# Patient Record
Sex: FEMALE | Race: WHITE | NOT HISPANIC OR LATINO | Employment: UNEMPLOYED | URBAN - METROPOLITAN AREA
[De-identification: names, ages, dates, MRNs, and addresses within clinical notes are randomized per-mention and may not be internally consistent; named-entity substitution may affect disease eponyms.]

---

## 2022-01-31 ENCOUNTER — TRANSCRIBE ORDERS (OUTPATIENT)
Dept: ADMINISTRATIVE | Facility: HOSPITAL | Age: 27
End: 2022-01-31

## 2022-01-31 ENCOUNTER — LAB (OUTPATIENT)
Dept: LAB | Facility: HOSPITAL | Age: 27
End: 2022-01-31

## 2022-01-31 ENCOUNTER — HOSPITAL ENCOUNTER (OUTPATIENT)
Dept: CARDIOLOGY | Facility: HOSPITAL | Age: 27
Discharge: HOME | End: 2022-01-31

## 2022-01-31 DIAGNOSIS — Z01.818 PREOP TESTING: ICD-10-CM

## 2022-01-31 DIAGNOSIS — Z01.818 PREOP TESTING: Primary | ICD-10-CM

## 2022-01-31 LAB
ANION GAP SERPL CALCULATED.3IONS-SCNC: 9.2 MMOL/L (ref 5–15)
BASOPHILS # BLD AUTO: 0.02 10*3/MM3 (ref 0–0.2)
BASOPHILS NFR BLD AUTO: 0.3 % (ref 0–1.5)
BUN SERPL-MCNC: 10 MG/DL (ref 6–20)
BUN/CREAT SERPL: 13.2 (ref 7–25)
CALCIUM SPEC-SCNC: 9.9 MG/DL (ref 8.6–10.5)
CHLORIDE SERPL-SCNC: 100 MMOL/L (ref 98–107)
CO2 SERPL-SCNC: 26.8 MMOL/L (ref 22–29)
CREAT SERPL-MCNC: 0.76 MG/DL (ref 0.57–1)
DEPRECATED RDW RBC AUTO: 38.6 FL (ref 37–54)
EOSINOPHIL # BLD AUTO: 0.08 10*3/MM3 (ref 0–0.4)
EOSINOPHIL NFR BLD AUTO: 1.2 % (ref 0.3–6.2)
ERYTHROCYTE [DISTWIDTH] IN BLOOD BY AUTOMATED COUNT: 12.4 % (ref 12.3–15.4)
GFR SERPL CREATININE-BSD FRML MDRD: 91 ML/MIN/1.73
GLUCOSE SERPL-MCNC: 84 MG/DL (ref 65–99)
HCT VFR BLD AUTO: 40 % (ref 34–46.6)
HGB BLD-MCNC: 13.2 G/DL (ref 12–15.9)
IMM GRANULOCYTES # BLD AUTO: 0.03 10*3/MM3 (ref 0–0.05)
IMM GRANULOCYTES NFR BLD AUTO: 0.5 % (ref 0–0.5)
LYMPHOCYTES # BLD AUTO: 2.27 10*3/MM3 (ref 0.7–3.1)
LYMPHOCYTES NFR BLD AUTO: 34.3 % (ref 19.6–45.3)
MCH RBC QN AUTO: 28 PG (ref 26.6–33)
MCHC RBC AUTO-ENTMCNC: 33 G/DL (ref 31.5–35.7)
MCV RBC AUTO: 84.9 FL (ref 79–97)
MONOCYTES # BLD AUTO: 0.61 10*3/MM3 (ref 0.1–0.9)
MONOCYTES NFR BLD AUTO: 9.2 % (ref 5–12)
NEUTROPHILS NFR BLD AUTO: 3.61 10*3/MM3 (ref 1.7–7)
NEUTROPHILS NFR BLD AUTO: 54.5 % (ref 42.7–76)
NRBC BLD AUTO-RTO: 0.2 /100 WBC (ref 0–0.2)
PLATELET # BLD AUTO: 271 10*3/MM3 (ref 140–450)
PMV BLD AUTO: 10.4 FL (ref 6–12)
POTASSIUM SERPL-SCNC: 4.7 MMOL/L (ref 3.5–5.2)
QT INTERVAL: 405 MS
RBC # BLD AUTO: 4.71 10*6/MM3 (ref 3.77–5.28)
SARS-COV-2 ORF1AB RESP QL NAA+PROBE: NOT DETECTED
SODIUM SERPL-SCNC: 136 MMOL/L (ref 136–145)
WBC NRBC COR # BLD: 6.62 10*3/MM3 (ref 3.4–10.8)

## 2022-01-31 PROCEDURE — 93010 ELECTROCARDIOGRAM REPORT: CPT | Performed by: INTERNAL MEDICINE

## 2022-01-31 PROCEDURE — U0004 COV-19 TEST NON-CDC HGH THRU: HCPCS

## 2022-01-31 PROCEDURE — 80048 BASIC METABOLIC PNL TOTAL CA: CPT

## 2022-01-31 PROCEDURE — C9803 HOPD COVID-19 SPEC COLLECT: HCPCS

## 2022-01-31 PROCEDURE — 36415 COLL VENOUS BLD VENIPUNCTURE: CPT

## 2022-01-31 PROCEDURE — 85025 COMPLETE CBC W/AUTO DIFF WBC: CPT

## 2022-01-31 PROCEDURE — 93005 ELECTROCARDIOGRAM TRACING: CPT | Performed by: SURGERY

## 2022-02-07 ENCOUNTER — TRANSCRIBE ORDERS (OUTPATIENT)
Dept: ADMINISTRATIVE | Facility: HOSPITAL | Age: 27
End: 2022-02-07

## 2022-02-07 ENCOUNTER — LAB (OUTPATIENT)
Dept: LAB | Facility: HOSPITAL | Age: 27
End: 2022-02-07

## 2022-02-07 DIAGNOSIS — Z01.818 PRE-OP TESTING: ICD-10-CM

## 2022-02-07 DIAGNOSIS — Z01.818 PRE-OP TESTING: Primary | ICD-10-CM

## 2022-02-07 LAB — SARS-COV-2 ORF1AB RESP QL NAA+PROBE: NOT DETECTED

## 2022-02-07 PROCEDURE — U0004 COV-19 TEST NON-CDC HGH THRU: HCPCS

## 2022-02-07 PROCEDURE — C9803 HOPD COVID-19 SPEC COLLECT: HCPCS

## 2022-02-10 ENCOUNTER — LAB REQUISITION (OUTPATIENT)
Dept: LAB | Facility: HOSPITAL | Age: 27
End: 2022-02-10

## 2022-02-10 DIAGNOSIS — Z00.00 ENCOUNTER FOR GENERAL ADULT MEDICAL EXAMINATION WITHOUT ABNORMAL FINDINGS: ICD-10-CM

## 2022-02-10 PROCEDURE — 88304 TISSUE EXAM BY PATHOLOGIST: CPT | Performed by: SURGERY

## 2022-02-11 LAB
LAB AP CASE REPORT: NORMAL
LAB AP CLINICAL INFORMATION: NORMAL
PATH REPORT.FINAL DX SPEC: NORMAL
PATH REPORT.GROSS SPEC: NORMAL

## 2024-01-12 ENCOUNTER — APPOINTMENT (OUTPATIENT)
Dept: CT IMAGING | Facility: HOSPITAL | Age: 29
End: 2024-01-12
Payer: MEDICAID

## 2024-01-12 ENCOUNTER — HOSPITAL ENCOUNTER (EMERGENCY)
Facility: HOSPITAL | Age: 29
Discharge: HOME | End: 2024-01-12
Attending: EMERGENCY MEDICINE
Payer: MEDICAID

## 2024-01-12 ENCOUNTER — APPOINTMENT (OUTPATIENT)
Dept: GENERAL RADIOLOGY | Facility: HOSPITAL | Age: 29
End: 2024-01-12
Payer: MEDICAID

## 2024-01-12 VITALS
DIASTOLIC BLOOD PRESSURE: 65 MMHG | WEIGHT: 139.55 LBS | HEIGHT: 67 IN | HEART RATE: 70 BPM | BODY MASS INDEX: 21.9 KG/M2 | OXYGEN SATURATION: 100 % | TEMPERATURE: 97.5 F | SYSTOLIC BLOOD PRESSURE: 110 MMHG | RESPIRATION RATE: 18 BRPM

## 2024-01-12 DIAGNOSIS — R07.9 CHEST PAIN, UNSPECIFIED TYPE: Primary | ICD-10-CM

## 2024-01-12 DIAGNOSIS — S00.03XA CONTUSION OF SCALP, INITIAL ENCOUNTER: ICD-10-CM

## 2024-01-12 DIAGNOSIS — R55 SYNCOPE, UNSPECIFIED SYNCOPE TYPE: ICD-10-CM

## 2024-01-12 DIAGNOSIS — R10.10 UPPER ABDOMINAL PAIN: ICD-10-CM

## 2024-01-12 LAB
ALBUMIN SERPL-MCNC: 4.3 G/DL (ref 3.5–5.2)
ALBUMIN/GLOB SERPL: 1.3 G/DL
ALP SERPL-CCNC: 79 U/L (ref 39–117)
ALT SERPL W P-5'-P-CCNC: 23 U/L (ref 1–33)
ANION GAP SERPL CALCULATED.3IONS-SCNC: 11 MMOL/L (ref 5–15)
AST SERPL-CCNC: 18 U/L (ref 1–32)
B-HCG UR QL: NEGATIVE
BACTERIA UR QL AUTO: ABNORMAL /HPF
BASOPHILS # BLD AUTO: 0 10*3/MM3 (ref 0–0.2)
BASOPHILS NFR BLD AUTO: 0.3 % (ref 0–1.5)
BILIRUB SERPL-MCNC: 0.2 MG/DL (ref 0–1.2)
BILIRUB UR QL STRIP: NEGATIVE
BUN SERPL-MCNC: 15 MG/DL (ref 6–20)
BUN/CREAT SERPL: 19.2 (ref 7–25)
CALCIUM SPEC-SCNC: 9.4 MG/DL (ref 8.6–10.5)
CHLORIDE SERPL-SCNC: 102 MMOL/L (ref 98–107)
CK SERPL-CCNC: 59 U/L (ref 20–180)
CLARITY UR: ABNORMAL
CO2 SERPL-SCNC: 24 MMOL/L (ref 22–29)
COLOR UR: YELLOW
CREAT SERPL-MCNC: 0.78 MG/DL (ref 0.57–1)
D DIMER PPP FEU-MCNC: 0.35 MG/L (FEU) (ref 0–0.5)
DEPRECATED RDW RBC AUTO: 38.1 FL (ref 37–54)
EGFRCR SERPLBLD CKD-EPI 2021: 104.9 ML/MIN/1.73
EOSINOPHIL # BLD AUTO: 0.1 10*3/MM3 (ref 0–0.4)
EOSINOPHIL NFR BLD AUTO: 0.7 % (ref 0.3–6.2)
ERYTHROCYTE [DISTWIDTH] IN BLOOD BY AUTOMATED COUNT: 12.7 % (ref 12.3–15.4)
GLOBULIN UR ELPH-MCNC: 3.2 GM/DL
GLUCOSE SERPL-MCNC: 105 MG/DL (ref 65–99)
GLUCOSE UR STRIP-MCNC: NEGATIVE MG/DL
HCT VFR BLD AUTO: 36.7 % (ref 34–46.6)
HGB BLD-MCNC: 12.1 G/DL (ref 12–15.9)
HGB UR QL STRIP.AUTO: NEGATIVE
HOLD SPECIMEN: NORMAL
HOLD SPECIMEN: NORMAL
HYALINE CASTS UR QL AUTO: ABNORMAL /LPF
KETONES UR QL STRIP: NEGATIVE
LEUKOCYTE ESTERASE UR QL STRIP.AUTO: ABNORMAL
LIPASE SERPL-CCNC: 35 U/L (ref 13–60)
LYMPHOCYTES # BLD AUTO: 1.9 10*3/MM3 (ref 0.7–3.1)
LYMPHOCYTES NFR BLD AUTO: 21.5 % (ref 19.6–45.3)
MAGNESIUM SERPL-MCNC: 2 MG/DL (ref 1.6–2.6)
MCH RBC QN AUTO: 28 PG (ref 26.6–33)
MCHC RBC AUTO-ENTMCNC: 32.9 G/DL (ref 31.5–35.7)
MCV RBC AUTO: 85.3 FL (ref 79–97)
MONOCYTES # BLD AUTO: 0.9 10*3/MM3 (ref 0.1–0.9)
MONOCYTES NFR BLD AUTO: 9.7 % (ref 5–12)
NEUTROPHILS NFR BLD AUTO: 6 10*3/MM3 (ref 1.7–7)
NEUTROPHILS NFR BLD AUTO: 67.8 % (ref 42.7–76)
NITRITE UR QL STRIP: NEGATIVE
NRBC BLD AUTO-RTO: 0 /100 WBC (ref 0–0.2)
PH UR STRIP.AUTO: 7 [PH] (ref 5–8)
PLATELET # BLD AUTO: 361 10*3/MM3 (ref 140–450)
PMV BLD AUTO: 7.4 FL (ref 6–12)
POTASSIUM SERPL-SCNC: 4.5 MMOL/L (ref 3.5–5.2)
PROT SERPL-MCNC: 7.5 G/DL (ref 6–8.5)
PROT UR QL STRIP: NEGATIVE
RBC # BLD AUTO: 4.3 10*6/MM3 (ref 3.77–5.28)
RBC # UR STRIP: ABNORMAL /HPF
REF LAB TEST METHOD: ABNORMAL
SODIUM SERPL-SCNC: 137 MMOL/L (ref 136–145)
SP GR UR STRIP: 1.01 (ref 1–1.03)
SQUAMOUS #/AREA URNS HPF: ABNORMAL /HPF
T4 FREE SERPL-MCNC: 1.19 NG/DL (ref 0.93–1.7)
TROPONIN T SERPL HS-MCNC: <6 NG/L
TSH SERPL DL<=0.05 MIU/L-ACNC: 0.67 UIU/ML (ref 0.27–4.2)
UROBILINOGEN UR QL STRIP: ABNORMAL
WBC # UR STRIP: ABNORMAL /HPF
WBC NRBC COR # BLD AUTO: 8.8 10*3/MM3 (ref 3.4–10.8)
WHOLE BLOOD HOLD COAG: NORMAL
WHOLE BLOOD HOLD SPECIMEN: NORMAL

## 2024-01-12 PROCEDURE — 85379 FIBRIN DEGRADATION QUANT: CPT | Performed by: EMERGENCY MEDICINE

## 2024-01-12 PROCEDURE — 80050 GENERAL HEALTH PANEL: CPT | Performed by: EMERGENCY MEDICINE

## 2024-01-12 PROCEDURE — 93005 ELECTROCARDIOGRAM TRACING: CPT | Performed by: EMERGENCY MEDICINE

## 2024-01-12 PROCEDURE — 81001 URINALYSIS AUTO W/SCOPE: CPT | Performed by: EMERGENCY MEDICINE

## 2024-01-12 PROCEDURE — 84439 ASSAY OF FREE THYROXINE: CPT | Performed by: EMERGENCY MEDICINE

## 2024-01-12 PROCEDURE — 99285 EMERGENCY DEPT VISIT HI MDM: CPT

## 2024-01-12 PROCEDURE — 93005 ELECTROCARDIOGRAM TRACING: CPT

## 2024-01-12 PROCEDURE — 84484 ASSAY OF TROPONIN QUANT: CPT | Performed by: EMERGENCY MEDICINE

## 2024-01-12 PROCEDURE — 71275 CT ANGIOGRAPHY CHEST: CPT

## 2024-01-12 PROCEDURE — 83690 ASSAY OF LIPASE: CPT | Performed by: EMERGENCY MEDICINE

## 2024-01-12 PROCEDURE — 82550 ASSAY OF CK (CPK): CPT | Performed by: EMERGENCY MEDICINE

## 2024-01-12 PROCEDURE — 71046 X-RAY EXAM CHEST 2 VIEWS: CPT

## 2024-01-12 PROCEDURE — 81025 URINE PREGNANCY TEST: CPT | Performed by: EMERGENCY MEDICINE

## 2024-01-12 PROCEDURE — 25810000003 LACTATED RINGERS SOLUTION: Performed by: EMERGENCY MEDICINE

## 2024-01-12 PROCEDURE — 83735 ASSAY OF MAGNESIUM: CPT | Performed by: EMERGENCY MEDICINE

## 2024-01-12 PROCEDURE — 25510000001 IOPAMIDOL PER 1 ML: Performed by: EMERGENCY MEDICINE

## 2024-01-12 PROCEDURE — 70450 CT HEAD/BRAIN W/O DYE: CPT

## 2024-01-12 RX ORDER — SODIUM CHLORIDE 0.9 % (FLUSH) 0.9 %
10 SYRINGE (ML) INJECTION AS NEEDED
Status: DISCONTINUED | OUTPATIENT
Start: 2024-01-12 | End: 2024-01-13 | Stop reason: HOSPADM

## 2024-01-12 RX ADMIN — SODIUM CHLORIDE, POTASSIUM CHLORIDE, SODIUM LACTATE AND CALCIUM CHLORIDE 1000 ML: 600; 310; 30; 20 INJECTION, SOLUTION INTRAVENOUS at 20:08

## 2024-01-12 RX ADMIN — IOPAMIDOL 100 ML: 755 INJECTION, SOLUTION INTRAVENOUS at 22:15

## 2024-01-13 LAB
QT INTERVAL: 350 MS
QTC INTERVAL: 430 MS

## 2024-01-13 NOTE — ED PROVIDER NOTES
Subjective   History of Present Illness  Chief complaint severe upper abdominal chest pain passed out hit head    History of present illness a 28-year-old female that she woke up about 4:00 in the morning with severe pain in the lower chest epigastric area was somewhat burning and sharp in nature nonradiating.  There was no neck arm jaw pain.  The patient did have some shortness of breath she got breathing fast tingling in all of her extremities she states and then she passed out and hit her head and was out for about 10 seconds.  There was no reported seizure activity.  She has had some intermittent symptoms throughout the day but no further syncopal episodes denied any type of visual changes speech difficulty or paralysis no facial droop speech has been normal no dysuria frequency no vomiting or diarrhea no black or bloody stool no recent illness flus viruses vaccinations no leg pain or swelling no recent long car ride plane or immobilization she does not take any birth control or hormone replacement.  She has no family history of early heart disease clotting disorders or sudden death.  Menstrual periods been normal no pregnancy concerns.      Review of Systems   Constitutional:  Negative for chills and fever.   Respiratory:  Positive for chest tightness and shortness of breath. Negative for cough.    Cardiovascular:  Positive for chest pain. Negative for palpitations.   Gastrointestinal:  Positive for abdominal pain and nausea. Negative for blood in stool, constipation and vomiting.   Genitourinary:  Negative for difficulty urinating, dysuria, hematuria and vaginal discharge.   Musculoskeletal:  Negative for back pain and neck pain.   Skin:  Negative for rash.   Neurological:  Negative for dizziness, facial asymmetry, speech difficulty, light-headedness and headaches.   Psychiatric/Behavioral:  Negative for confusion.        No past medical history on file.  No health problems  No Known Allergies    No past  surgical history on file.    No family history on file.  Various types of cancers  Social History     Socioeconomic History    Marital status:    Social history no tobacco alcohol or drug use  Medication no prescription medications but some over-the-counter supplements      Objective   Physical Exam  Constitutional is a 28-year-old female awake alert no acute distress resting comfortably triage vital signs reviewed.  HEENT extraocular muscles are intact pupils equal round reactive no raccoon or Crenshaw sign no photophobia mouth clear.  Back no cervical thoracic lumbar spine tenderness is noted.  Trachea midline no JV no bruits no thyroid nodules lungs clear no retractions heart regular without murmur rub both lying and sitting.  Abdomen soft nontender good bowel sounds no peritoneal findings or pulsatile masses no enlarged liver or spleen no chest wall tenderness no redness or swelling or rash to the chest wall extremities pulses equal upper and lower extremities no edema no cords no Homans' sign no evidence of DVT skin warm and dry without rashes or cellulitic changes neurologic awake alert orientated x 4 no Paci symmetry speech normal tongue soft palate normal no drift the arms or legs sits up without difficulty no ataxia no truncal ataxia.  Toes downgoing no clonus no focal findings Steve Coma Scale 15  Procedures           ED Course      Results for orders placed or performed during the hospital encounter of 01/12/24   Single High Sensitivity Troponin T    Specimen: Blood   Result Value Ref Range    HS Troponin T <6 <14 ng/L   Comprehensive Metabolic Panel    Specimen: Blood   Result Value Ref Range    Glucose 105 (H) 65 - 99 mg/dL    BUN 15 6 - 20 mg/dL    Creatinine 0.78 0.57 - 1.00 mg/dL    Sodium 137 136 - 145 mmol/L    Potassium 4.5 3.5 - 5.2 mmol/L    Chloride 102 98 - 107 mmol/L    CO2 24.0 22.0 - 29.0 mmol/L    Calcium 9.4 8.6 - 10.5 mg/dL    Total Protein 7.5 6.0 - 8.5 g/dL    Albumin 4.3 3.5 -  5.2 g/dL    ALT (SGPT) 23 1 - 33 U/L    AST (SGOT) 18 1 - 32 U/L    Alkaline Phosphatase 79 39 - 117 U/L    Total Bilirubin 0.2 0.0 - 1.2 mg/dL    Globulin 3.2 gm/dL    A/G Ratio 1.3 g/dL    BUN/Creatinine Ratio 19.2 7.0 - 25.0    Anion Gap 11.0 5.0 - 15.0 mmol/L    eGFR 104.9 >60.0 mL/min/1.73   Lipase    Specimen: Blood   Result Value Ref Range    Lipase 35 13 - 60 U/L   Pregnancy, Urine - Urine, Clean Catch    Specimen: Urine, Clean Catch   Result Value Ref Range    HCG, Urine QL Negative Negative   Urinalysis With Microscopic If Indicated (No Culture) - Urine, Clean Catch    Specimen: Urine, Clean Catch   Result Value Ref Range    Color, UA Yellow Yellow, Straw    Appearance, UA Hazy (A) Clear    pH, UA 7.0 5.0 - 8.0    Specific Gravity, UA 1.007 1.005 - 1.030    Glucose, UA Negative Negative    Ketones, UA Negative Negative    Bilirubin, UA Negative Negative    Blood, UA Negative Negative    Protein, UA Negative Negative    Leuk Esterase, UA Moderate (2+) (A) Negative    Nitrite, UA Negative Negative    Urobilinogen, UA 0.2 E.U./dL 0.2 - 1.0 E.U./dL   D-dimer, Quantitative    Specimen: Blood   Result Value Ref Range    D-Dimer, Quantitative 0.35 0.00 - 0.50 mg/L (FEU)   TSH    Specimen: Blood   Result Value Ref Range    TSH 0.666 0.270 - 4.200 uIU/mL   Magnesium    Specimen: Blood   Result Value Ref Range    Magnesium 2.0 1.6 - 2.6 mg/dL   CK    Specimen: Blood   Result Value Ref Range    Creatine Kinase 59 20 - 180 U/L   T4, Free    Specimen: Blood   Result Value Ref Range    Free T4 1.19 0.93 - 1.70 ng/dL   CBC Auto Differential    Specimen: Blood   Result Value Ref Range    WBC 8.80 3.40 - 10.80 10*3/mm3    RBC 4.30 3.77 - 5.28 10*6/mm3    Hemoglobin 12.1 12.0 - 15.9 g/dL    Hematocrit 36.7 34.0 - 46.6 %    MCV 85.3 79.0 - 97.0 fL    MCH 28.0 26.6 - 33.0 pg    MCHC 32.9 31.5 - 35.7 g/dL    RDW 12.7 12.3 - 15.4 %    RDW-SD 38.1 37.0 - 54.0 fl    MPV 7.4 6.0 - 12.0 fL    Platelets 361 140 - 450 10*3/mm3     Neutrophil % 67.8 42.7 - 76.0 %    Lymphocyte % 21.5 19.6 - 45.3 %    Monocyte % 9.7 5.0 - 12.0 %    Eosinophil % 0.7 0.3 - 6.2 %    Basophil % 0.3 0.0 - 1.5 %    Neutrophils, Absolute 6.00 1.70 - 7.00 10*3/mm3    Lymphocytes, Absolute 1.90 0.70 - 3.10 10*3/mm3    Monocytes, Absolute 0.90 0.10 - 0.90 10*3/mm3    Eosinophils, Absolute 0.10 0.00 - 0.40 10*3/mm3    Basophils, Absolute 0.00 0.00 - 0.20 10*3/mm3    nRBC 0.0 0.0 - 0.2 /100 WBC   Urinalysis, Microscopic Only - Urine, Clean Catch    Specimen: Urine, Clean Catch   Result Value Ref Range    RBC, UA 3-5 (A) None Seen, 0-2 /HPF    WBC, UA 11-20 (A) None Seen, 0-2 /HPF    Bacteria, UA 1+ (A) None Seen /HPF    Squamous Epithelial Cells, UA 7-12 (A) None Seen, 0-2 /HPF    Hyaline Casts, UA 0-2 None Seen /LPF    Methodology Automated Microscopy    ECG 12 Lead Syncope   Result Value Ref Range    QT Interval 350 ms    QTC Interval 430 ms   Green Top (Gel)   Result Value Ref Range    Extra Tube Hold for add-ons.    Lavender Top   Result Value Ref Range    Extra Tube hold for add-on    Gold Top - SST   Result Value Ref Range    Extra Tube Hold for add-ons.    Light Blue Top   Result Value Ref Range    Extra Tube Hold for add-ons.      CT Angiogram Chest Pulmonary Embolism    Result Date: 1/12/2024  No pulmonary embolus is noted. No acute cardiopulmonary disease. Electronically Signed: Maikel Small MD  1/12/2024 10:19 PM EST  Workstation ID: [id number]    CT Head Without Contrast    Result Date: 1/12/2024  Impression: 1.No acute intracranial process or calvarial fracture identified. 2.Moderate frothy paranasal sinus mucosal disease. Correlate for acute sinusitis. Electronically Signed: Matthieu Hull MD  1/12/2024 10:04 PM EST  Workstation ID: [id number]    XR Chest 2 View    Result Date: 1/12/2024  Impression: No acute cardiopulmonary disease. Electronically Signed: Maikel Small MD  1/12/2024 8:35 PM EST  Workstation ID: [id number]   Medications   sodium chloride 0.9 %  flush 10 mL (has no administration in time range)   sodium chloride 0.9 % flush 10 mL (has no administration in time range)   lactated ringers bolus 1,000 mL (0 mL Intravenous Stopped 1/12/24 2320)   iopamidol (ISOVUE-370) 76 % injection 100 mL (100 mL Intravenous Given 1/12/24 2215)                                    EKG my interpretation normal sinus rhythm rate of 90 normal axis no hypertrophy QTc of 430 normal EKG unchanged from 1/31/2022          Medical Decision Making  Medical decision making.  IV established monitor placement review of sinus rhythm.  Patient on 1 L lactated Ringer's as initial blood pressure is running around 100 systolic.  But heart rate was good.  The patient underwent an EKG my independent review shows normal sinus rhythm rate 90 normal axis hypertrophy was a normal EKG unchanged from 1/31/2022 chest x-ray my independent review no pneumonia pneumothorax CHF or acute findings radiology unremarkable.  Labs obtained independent reviewed by me troponin negative comprehensive metabolic profile normal pregnancy test negative the urinalysis unremarkable did have 1+ bacteria 20 white cells but had 12 epithelial cells and considered a contaminated patient has no dysuria frequency so we will not treat.  TSH magnesium T4 CK normal CBC normal.  Patient underwent a head CT without obtained independent viewed by me do not see any tumors masses hemorrhage or fractures radiology unremarkable other than some sinus disease.  The patient had a CT PE protocol my independent review I do not see any pulmonary embolism or pneumothorax or pneumonia or aneurysm dissection radiology was unremarkable.  The patient repeat exam is resting company blood pressure 110/65 heart rate 70 respirations 18 sats 100% on room air.  Neurologic exam is normal Mallory Coma Scale 15.  I do not see evidence of pulmonary embolism acute myocardial infarction DVT she has not had any rhythm changes I will see evidence of intra-abdominal  process such as acute cholecystitis aneurysm dissection I will see evidence of acute cholecystitis pancreatitis or ischemic bowel or bowel obstruction tubal pregnancy followed on a complete list of all possibilities that can lead to pain and syncope.  I will see evidence of an acute stroke intracerebral hemorrhage subarachnoid hemorrhage send no bleeding.  We talked about the findings she is overall low risk heart score less than 3 low syncopal risk as well.  She will follow-up with her doctors office we talked about what to return for and she voiced understanding stable otherwise unremarkable ER course.    Problems Addressed:  Chest pain, unspecified type: complicated acute illness or injury  Contusion of scalp, initial encounter: complicated acute illness or injury  Syncope, unspecified syncope type: complicated acute illness or injury  Upper abdominal pain: complicated acute illness or injury    Amount and/or Complexity of Data Reviewed  Labs: ordered. Decision-making details documented in ED Course.  Radiology: ordered and independent interpretation performed. Decision-making details documented in ED Course.  ECG/medicine tests: ordered and independent interpretation performed. Decision-making details documented in ED Course.        Final diagnoses:   Chest pain, unspecified type   Upper abdominal pain   Syncope, unspecified syncope type   Contusion of scalp, initial encounter       ED Disposition  ED Disposition       ED Disposition   Discharge    Condition   Stable    Comment   --               Leny Cabrera MD  [address] [facility] [place] [address]  [phone]    In 3 days           Medication List      No changes were made to your prescriptions during this visit.            Sathish Morales MD  01/13/24 0019

## 2024-01-13 NOTE — DISCHARGE INSTRUCTIONS
Return for increasing pain reoccurrence of symptoms fever vomiting vomiting blood black or bloody stool altered mental status.  Headaches visual disturbances weakness to one-sided the other shortness of breath passing out again seizure activity or any other new or worsening problems or concerns return immediately to the ER.  Follow-up your primary care doctor on Monday for further evaluation.  Consider gallbladder disease endoscopies for peptic ulcer disease further outpatient testing.  Consider Pepcid AC or Prilosec OTC or burning.  EKG unremarkable  All CT head without CT chest upper abdomen unremarkable other than some sinus disease.  All labs blood count electrolytes heart enzymes thyroid unremarkable.

## 2024-02-05 ENCOUNTER — TRANSCRIBE ORDERS (OUTPATIENT)
Dept: ADMINISTRATIVE | Facility: HOSPITAL | Age: 29
End: 2024-02-05
Payer: MEDICAID

## 2024-02-05 DIAGNOSIS — R11.0 NAUSEA: ICD-10-CM

## 2024-02-05 DIAGNOSIS — R10.11 RIGHT UPPER QUADRANT ABDOMINAL PAIN: Primary | ICD-10-CM

## 2024-02-09 ENCOUNTER — HOSPITAL ENCOUNTER (OUTPATIENT)
Dept: NUCLEAR MEDICINE | Facility: HOSPITAL | Age: 29
Discharge: HOME | End: 2024-02-09
Payer: MEDICAID

## 2024-02-09 DIAGNOSIS — R11.0 NAUSEA: ICD-10-CM

## 2024-02-09 DIAGNOSIS — R10.11 RIGHT UPPER QUADRANT ABDOMINAL PAIN: ICD-10-CM

## 2024-02-09 PROCEDURE — 78227 HEPATOBIL SYST IMAGE W/DRUG: CPT

## 2024-02-09 PROCEDURE — 0 TECHNETIUM TC 99M MEBROFENIN KIT: Performed by: NURSE PRACTITIONER

## 2024-02-09 PROCEDURE — A9537 TC99M MEBROFENIN: HCPCS | Performed by: NURSE PRACTITIONER

## 2024-02-09 RX ORDER — KIT FOR THE PREPARATION OF TECHNETIUM TC 99M MEBROFENIN 45 MG/10ML
1 INJECTION, POWDER, LYOPHILIZED, FOR SOLUTION INTRAVENOUS
Status: COMPLETED | OUTPATIENT
Start: 2024-02-09 | End: 2024-02-09

## 2024-02-09 RX ADMIN — MEBROFENIN 1 DOSE: 45 INJECTION, POWDER, LYOPHILIZED, FOR SOLUTION INTRAVENOUS at 11:37

## 2024-10-14 ENCOUNTER — TRANSCRIBE ORDERS (OUTPATIENT)
Dept: ADMINISTRATIVE | Facility: HOSPITAL | Age: 29
End: 2024-10-14
Payer: MEDICAID

## 2024-10-14 DIAGNOSIS — R22.32 LOCALIZED SWELLING, MASS AND LUMP, UPPER LIMB, LEFT: Primary | ICD-10-CM
